# Patient Record
Sex: MALE | URBAN - METROPOLITAN AREA
[De-identification: names, ages, dates, MRNs, and addresses within clinical notes are randomized per-mention and may not be internally consistent; named-entity substitution may affect disease eponyms.]

---

## 2023-08-09 DIAGNOSIS — Z12.11 COLON CANCER SCREENING: Primary | ICD-10-CM

## 2023-11-06 ENCOUNTER — TELEPHONE (OUTPATIENT)
Dept: GASTROENTEROLOGY | Facility: CLINIC | Age: 48
End: 2023-11-06

## 2023-11-06 VITALS — WEIGHT: 182 LBS | BODY MASS INDEX: 29.25 KG/M2 | HEIGHT: 66 IN

## 2023-11-06 DIAGNOSIS — Z12.11 COLON CANCER SCREENING: Primary | ICD-10-CM

## 2023-11-06 NOTE — TELEPHONE ENCOUNTER
"[facility]  [address] [place] [address]  Phone: [phone]  Fax: [phone]    History & Physical         Provider: Dr. Vanita Ramirez    Patient Name: Sb RAO (age):1975  48 y.o.           Gender: male   Phone: [phone]     Referring Physician: Solomon Gusman     Vital Signs:   Height - 5' 6"  Weight - 182 LB  BMI -  29.38    Plan: COLONOSCOPY @ [facility]    Encounter Diagnosis   Name Primary?    Colon cancer screening Yes           History:      Past Medical History:   Diagnosis Date    BMI 29.0-29.9,adult     History of hepatitis C 2018    s/p Harvoni x12 weeks, Spring 2018, cured      Past Surgical History:   Procedure Laterality Date    HAND TENDON SURGERY Right     WISDOM TOOTH EXTRACTION        Medication List with Changes/Refills   Current Medications    SOD SULF-POT CHLORIDE-MAG SULF (SUTAB) 1.479-0.188- 0.225 GRAM TABLET    Take 12 tablets by mouth once daily. Take according to package instructions with indicated amount of water. No breakfast day before test. May substitute with Suprep, Clenpiq, Plenvu, Moviprep or GoLytely based on Rx plan and patient preference.      Review of patient's allergies indicates:  No Known Allergies   Family History   Problem Relation Age of Onset    No Known Problems Mother     Pancreatic cancer Father 62    No Known Problems Brother     No Known Problems Brother     No Known Problems Maternal Grandmother     No Known Problems Maternal Grandfather     No Known Problems Paternal Grandmother     No Known Problems Paternal Grandfather       Social History     Tobacco Use    Smoking status: Never    Smokeless tobacco: Never   Substance Use Topics    Alcohol use: Yes     Alcohol/week: 48.0 standard drinks of alcohol     Types: 48 Cans of beer per week    Drug use: Never        Physical Examination:     General " Appearance:___________________________  HEENT: _____________________________________  Abdomen:____________________________________  Heart:________________________________________  Lungs:_______________________________________  Extremities:___________________________________  Skin:_________________________________________  Endocrine:____________________________________  Genitourinary:_________________________________  Neurological:__________________________________      Patient has been evaluated immediately prior to sedation and is medically cleared for endoscopy with IVCS as an ASA class: ______      Physician Signature: _________________________       Date: ________  Time: ________

## 2023-11-06 NOTE — TELEPHONE ENCOUNTER
"[facility]  [address] [place] [address]  Phone: [phone]  Fax: [phone]    History & Physical         Provider: Dr. Vanita Ramirez    Patient Name: Sb RAO (age):1975  48 y.o.           Gender: male   Phone: [phone]     Referring Physician: Solomon Gusman     Vital Signs:   Height - 5' 6"  Weight - 182 LB  BMI -  29.38    Plan: COLONOSCOPY @ [facility]    Encounter Diagnosis   Name Primary?    Colon cancer screening Yes           History:      Past Medical History:   Diagnosis Date    BMI 29.0-29.9,adult     History of hepatitis C 2018    s/p Harvoni x12 weeks, Spring 2018, cured      Past Surgical History:   Procedure Laterality Date    HAND TENDON SURGERY Right     WISDOM TOOTH EXTRACTION        Medication List with Changes/Refills   Current Medications    SOD SULF-POT CHLORIDE-MAG SULF (SUTAB) 1.479-0.188- 0.225 GRAM TABLET    Take 12 tablets by mouth once daily. Take according to package instructions with indicated amount of water. No breakfast day before test. May substitute with Suprep, Clenpiq, Plenvu, Moviprep or GoLytely based on Rx plan and patient preference.      Review of patient's allergies indicates:  No Known Allergies   Family History   Problem Relation Age of Onset    No Known Problems Mother     Pancreatic cancer Father 62    No Known Problems Brother     No Known Problems Brother     No Known Problems Maternal Grandmother     No Known Problems Maternal Grandfather     No Known Problems Paternal Grandmother     No Known Problems Paternal Grandfather       Social History     Tobacco Use    Smoking status: Never    Smokeless tobacco: Never   Substance Use Topics    Alcohol use: Yes     Alcohol/week: 48.0 standard drinks of alcohol     Types: 48 Cans of beer per week    Drug use: Never        Physical Examination:     General " Appearance:___________________________  HEENT: _____________________________________  Abdomen:____________________________________  Heart:________________________________________  Lungs:_______________________________________  Extremities:___________________________________  Skin:_________________________________________  Endocrine:____________________________________  Genitourinary:_________________________________  Neurological:__________________________________      Patient has been evaluated immediately prior to sedation and is medically cleared for endoscopy with IVCS as an ASA class: ______      Physician Signature: _________________________       Date: ________  Time: ________

## 2024-02-05 NOTE — TELEPHONE ENCOUNTER
From: Ronald Malia   Sent: 2/5/2024  10:04 AM CST   To: [facility]astro[facility] Procedure Scheduling     Type: Staff Message     Caller: Sb Hurtado   Call Back Number: [phone]   Nature of the Call: pt needing to reschedule colonoscopy.   Additional Information: na     Returned pt call and r/s for 6/14/24, Friday. I told pt that I would call him a week before his procedure to make sure he had everything. Pt also asked that I send an email to [email or web address] with the date for his colonoscopy. DONE. amador

## 2024-06-06 ENCOUNTER — TELEPHONE (OUTPATIENT)
Dept: GASTROENTEROLOGY | Facility: CLINIC | Age: 49
End: 2024-06-06
Payer: COMMERCIAL

## 2024-06-06 VITALS — WEIGHT: 182 LBS | BODY MASS INDEX: 29.25 KG/M2 | HEIGHT: 66 IN

## 2024-06-06 DIAGNOSIS — Z12.11 COLON CANCER SCREENING: Primary | ICD-10-CM

## 2024-06-06 NOTE — TELEPHONE ENCOUNTER
"[facility]  [address] [place] [address]  Phone: [phone]  Fax: [phone]    History & Physical         Provider: Dr. Vanita Ramirez    Patient Name: Sb RAO (age):1975  48 y.o.           Gender: male   Phone: [phone]     Referring Physician: Solomon Gusman     Vital Signs:   Height - 5' 6"  Weight - 182 lb  BMI -  29.38    Plan: Colonoscopy @ [facility]    Encounter Diagnosis   Name Primary?    Colon cancer screening Yes           History:      Past Medical History:   Diagnosis Date    BMI 29.0-29.9,adult     History of hepatitis C 2018    s/p Harvoni x12 weeks, Spring 2018, cured      Past Surgical History:   Procedure Laterality Date    HAND TENDON SURGERY Right     WISDOM TOOTH EXTRACTION        Medication List with Changes/Refills   Current Medications    SOD SULF-POT CHLORIDE-MAG SULF (SUTAB) 1.479-0.188- 0.225 GRAM TABLET    Take 12 tablets by mouth once daily. Take according to package instructions with indicated amount of water. No breakfast day before test. May substitute with Suprep, Clenpiq, Plenvu, Moviprep or GoLytely based on Rx plan and patient preference.      Review of patient's allergies indicates:  No Known Allergies   Family History   Problem Relation Name Age of Onset    No Known Problems Mother      Pancreatic cancer Father  62    No Known Problems Brother      No Known Problems Brother      No Known Problems Maternal Grandmother      No Known Problems Maternal Grandfather      No Known Problems Paternal Grandmother      No Known Problems Paternal Grandfather        Social History     Tobacco Use    Smoking status: Never    Smokeless tobacco: Never   Substance Use Topics    Alcohol use: Yes     Alcohol/week: 48.0 standard drinks of alcohol     Types: 48 Cans of beer per week    Drug use: Never        Physical Examination:     General " Appearance:___________________________  HEENT: _____________________________________  Abdomen:____________________________________  Heart:________________________________________  Lungs:_______________________________________  Extremities:___________________________________  Skin:_________________________________________  Endocrine:____________________________________  Genitourinary:_________________________________  Neurological:__________________________________      Patient has been evaluated immediately prior to sedation and is medically cleared for endoscopy with IVCS as an ASA class: ______      Physician Signature: _________________________       Date: ________  Time: ________

## 2024-06-06 NOTE — TELEPHONE ENCOUNTER
Called and left a detailed message that I was calling as a courtesy regarding up coming Colon with NBP on 6/14/24, Friday and wanted to verify that he has his paper prep instructions and meds. I also mentioned that [facility] will call the day before (THURS) with the arrival time, GI Lab is located on the third floor, and to pre-register before next Thurs. amador

## 2024-06-10 DIAGNOSIS — Z12.11 COLON CANCER SCREENING: ICD-10-CM

## 2024-06-10 RX ORDER — SOD SULF/POT CHLORIDE/MAG SULF 1.479 G
12 TABLET ORAL DAILY
Qty: 24 TABLET | Refills: 0 | Status: SHIPPED | OUTPATIENT
Start: 2024-06-10

## 2024-06-10 NOTE — TELEPHONE ENCOUNTER
----- Message from Rhonda Cherry sent at 6/10/2024  9:06 AM CDT -----  Contact: self  Type:  RX Refill Request    Who Called: Sb Hurtado  Refill or New Rx:refill  RX Name and Strength: sod sulf-pot chloride-mag sulf (SUTAB) 1.479-0.188- 0.225 gram tablet           How is the patient currently taking it? (ex. 1XDay):daily  Is this a 30 day or 90 day RX:n/a  Preferred Pharmacy with phone number:  [facility]/pharmacy #[id number] - [place], [place] - [address] [address]  Phone: [phone] Fax: [phone]    Local or Mail Order:local  Ordering Provider:gio courtney  Would the patient rather a call back or a response via [facility]?   Best Call Back Number:[phone]  Additional Information: n/a

## 2024-06-12 NOTE — TELEPHONE ENCOUNTER
Prep Instruction  Received: Today   Pt Advice  Maria Luisa [facility] Staff  Caller: patient (Today,  8:40 AM)  Per phone call with patient, he is needing the prep instruction to be email at [email or web address].  If additional information is needed please call [phone] (home).    Thanks,  SJ

## 2024-06-12 NOTE — TELEPHONE ENCOUNTER
Spoke with patient. I let him know that I sent the prep instructions to the email provided. Patient advised he received it. DMP

## 2024-06-14 ENCOUNTER — OUTSIDE PLACE OF SERVICE (OUTPATIENT)
Dept: GASTROENTEROLOGY | Facility: CLINIC | Age: 49
End: 2024-06-14

## 2024-06-23 ENCOUNTER — TELEPHONE (OUTPATIENT)
Dept: GASTROENTEROLOGY | Facility: CLINIC | Age: 49
End: 2024-06-23
Payer: COMMERCIAL

## 2024-06-24 NOTE — TELEPHONE ENCOUNTER
3 hyp (one AC), repeat colonoscopy in 5 years.     Notify patient his colon polyps were benign. Repeat colonoscopy in 5 years rather than 3 years. Send these notes with colonoscopy and pathology reports to his PHCP (confirm if [facility] or [facility] and update Epic). Confirm recall tab in appointment desk is up-to-date (update if needed).  NBP

## 2024-06-26 NOTE — TELEPHONE ENCOUNTER
Spoke with patient. He was notified of results and voiced understanding. Recall tab has been updated. These notes, colonoscopy report, and path report faxed to PCP Bogdan(updated chart) at [phone]. DMP